# Patient Record
Sex: MALE | Race: WHITE | NOT HISPANIC OR LATINO | URBAN - METROPOLITAN AREA
[De-identification: names, ages, dates, MRNs, and addresses within clinical notes are randomized per-mention and may not be internally consistent; named-entity substitution may affect disease eponyms.]

---

## 2017-12-06 ENCOUNTER — EMERGENCY (EMERGENCY)
Facility: HOSPITAL | Age: 42
LOS: 0 days | Discharge: HOME | End: 2017-12-07
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
## 2017-12-06 DIAGNOSIS — S99.911A UNSPECIFIED INJURY OF RIGHT ANKLE, INITIAL ENCOUNTER: ICD-10-CM
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
## 2017-12-06 DIAGNOSIS — M25.561 PAIN IN RIGHT KNEE: ICD-10-CM

## 2017-12-06 DIAGNOSIS — S93.401A SPRAIN OF UNSPECIFIED LIGAMENT OF RIGHT ANKLE, INITIAL ENCOUNTER: ICD-10-CM

## 2021-10-18 ENCOUNTER — EMERGENCY (EMERGENCY)
Facility: HOSPITAL | Age: 46
LOS: 1 days | Discharge: HOME | End: 2021-10-18
Attending: EMERGENCY MEDICINE
Payer: COMMERCIAL

## 2021-10-18 VITALS
RESPIRATION RATE: 20 BRPM | SYSTOLIC BLOOD PRESSURE: 166 MMHG | HEIGHT: 70 IN | HEART RATE: 81 BPM | OXYGEN SATURATION: 97 % | WEIGHT: 184.97 LBS | TEMPERATURE: 98 F | DIASTOLIC BLOOD PRESSURE: 108 MMHG

## 2021-10-18 PROCEDURE — 99284 EMERGENCY DEPT VISIT MOD MDM: CPT

## 2021-10-18 PROCEDURE — 99283 EMERGENCY DEPT VISIT LOW MDM: CPT

## 2021-10-18 RX ORDER — IBUPROFEN 200 MG
200 TABLET ORAL ONCE
Refills: 0 | Status: COMPLETED | OUTPATIENT
Start: 2021-10-18 | End: 2021-10-18

## 2021-10-18 RX ORDER — CYCLOBENZAPRINE HYDROCHLORIDE 10 MG/1
1 TABLET, FILM COATED ORAL
Qty: 3 | Refills: 0
Start: 2021-10-18 | End: 2021-10-20

## 2021-10-18 RX ORDER — LIDOCAINE 4 G/100G
1 CREAM TOPICAL ONCE
Refills: 0 | Status: COMPLETED | OUTPATIENT
Start: 2021-10-18 | End: 2021-10-18

## 2021-10-18 RX ORDER — ACETAMINOPHEN 500 MG
975 TABLET ORAL ONCE
Refills: 0 | Status: COMPLETED | OUTPATIENT
Start: 2021-10-18 | End: 2021-10-18

## 2021-10-18 RX ORDER — CYCLOBENZAPRINE HYDROCHLORIDE 10 MG/1
10 TABLET, FILM COATED ORAL ONCE
Refills: 0 | Status: COMPLETED | OUTPATIENT
Start: 2021-10-18 | End: 2021-10-18

## 2021-10-18 RX ADMIN — CYCLOBENZAPRINE HYDROCHLORIDE 10 MILLIGRAM(S): 10 TABLET, FILM COATED ORAL at 17:10

## 2021-10-18 RX ADMIN — Medication 200 MILLIGRAM(S): at 15:38

## 2021-10-18 RX ADMIN — Medication 975 MILLIGRAM(S): at 15:38

## 2021-10-18 RX ADMIN — LIDOCAINE 1 PATCH: 4 CREAM TOPICAL at 17:11

## 2021-10-18 NOTE — ED PROVIDER NOTE - CLINICAL SUMMARY MEDICAL DECISION MAKING FREE TEXT BOX
back pain likely sciatica/peripheral neuropathy 2/2 muscle spasm  Pain control  NSAIDs/Tylenol  Lido patch  Muscle relaxants  Spine f/u  Reassess back pain likely sciatica/peripheral neuropathy 2/2 muscle spasm  Pain control  NSAIDs/Tylenol  Lido patch  Muscle relaxants  Spine f/u  Reassess     Attn - acute back pain with sciatica,  NSAIDs, analgesia, muscle relax.  back education, reassess.

## 2021-10-18 NOTE — ED PROVIDER NOTE - PHYSICAL EXAMINATION
CONSTITUTIONAL: Well appearing and in no apparent distress.  ENT: Airway patent, moist mucous membranes.   EYES: Pupils equal, round and reactive to light. EOMI. Conjunctiva normal appearing.   CARDIAC: Normal rate, regular rhythm.  Heart sounds S1, S2.   RESPIRATORY: Breath sounds clear and equal bilaterally.   GASTROINTESTINAL: Abdomen soft, non-tender, not distended.  : No testicular TTP. No hernias noted. Chaperoned by Dr. Cox  MUSCULOSKELETAL: Spine appears normal, no muscle or joint tenderness. No midline TTP. +SLR bilaterally, pain exacerbated more left lower back. DP pulses and sensation intact BLE.   Pt appears uncomfortable any time he repositions himself in stretcher.   NEUROLOGICAL: Alert and oriented x3, no focal deficits, no motor or sensory deficits. 5/5 muscle strength throughout.  SKIN: Skin normal color, warm, dry and intact. CONSTITUTIONAL: Well appearing and in no apparent distress.  ENT: Airway patent, moist mucous membranes.   EYES: Pupils equal, round and reactive to light. EOMI. Conjunctiva normal appearing.   CARDIAC: Normal rate, regular rhythm.  Heart sounds S1, S2.   RESPIRATORY: Breath sounds clear and equal bilaterally.   GASTROINTESTINAL: Abdomen soft, non-tender, not distended.  : No testicular TTP. No hernias noted. Chaperoned by Dr. Cox  MUSCULOSKELETAL: Spine appears normal, no muscle or joint tenderness. No midline TTP. +SLR bilaterally, pain exacerbated more left lower back. DP pulses and sensation intact BLE.   Pt appears uncomfortable any time he repositions himself in stretcher.   NEUROLOGICAL: Alert and oriented x3, no focal deficits, no motor or sensory deficits. 5/5 muscle strength throughout.  SKIN: Skin normal color, warm, dry and intact.     attn - alert, mod pain, Back - no rash or lesion, tender paraL, no CVAT, abdo - soft and NT, ND, no hernia.  +SLR on left and + crossed SLR.  DTRs +2/4,=, motor and sensory intact.  no clonus or foot drop, no leg edema. + distal pulses.
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
## 2021-10-18 NOTE — ED PROVIDER NOTE - OBJECTIVE STATEMENT
45 yo M with a PMH of L inguinal hernia sp lap repair with mesh years ago p/w lower back pain x 3 days. Pt states left lower back worse than right. Has been "tight" for for a few days but pain exacerbated today when he yanked on a heavy door too hard while at work. Took ibuprofen PTA with no relief. Pain is sharp and shooting, worse with certain movements. Pain radiates down LLE to L groin as well and is associated with a numbness down his L thigh. Denies nausea, vomiting, fever, chills, chest pain, SOB, abd pain, diarrhea, urinary complaints, extremity weakness, direct trauma, syncope, bladder/bowel dsfxn. Denies hx of back surgery 45 yo M with a PMH of L inguinal hernia sp lap repair with mesh years ago p/w lower back pain x 3 days. Pt states left lower back worse than right. Has been "tight" for for a few days but pain exacerbated today when he yanked on a heavy door too hard while at work. Took ibuprofen PTA with no relief. Pain is sharp and shooting, worse with certain movements. Pain radiates down LLE to L groin as well and is associated with a numbness down his L thigh. Denies nausea, vomiting, fever, chills, chest pain, SOB, abd pain, diarrhea, urinary complaints, extremity weakness, direct trauma, syncope, bladder/bowel dsfxn. Denies hx of back surgery     Attn - pt seen in [facility] -  agree with above except as noted.  c/o lower back pain with radiation to left hip and groin/inner thigh. 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  no red flags for back pain.  hx of prior back pain and not currently under care.  Rec'd tylenol and motrin in triage and decreased pain.

## 2021-10-18 NOTE — ED PROVIDER NOTE - NSFOLLOWUPINSTRUCTIONS_ED_ALL_ED_FT
You were seen and evaluated in the ED for low back pain.   Please make sure to follow up with your primary care doctor within 1-2 days and with the specialist. The information for follow up can be found below. Bring a copy of all of your results with you to your follow up appointments.   Return to the ER as discussed if you develop any new or worsening symptoms.    You may take Tylenol 1000mg and Ibuprofen 400mg every 6 hours as needed for pain. Please make sure to take ibuprofen with food.  You can also use Lidocaine patches as recommended that are available OTC at the pharmacy (ex. [facility]).      Schedule an appointment with the spine specialist. The number is   [phone]    Return to the emergency room immediately if your symptoms worsen or persist, fever, new or worsening pain in the affected area, difficulty walking, weight loss, redness of the back, abdominal pain, vomiting, incontinence (pooping or peeing yourself), unusual numbness, tingling, weakness, or if any other concerning or questionable symptoms.      Acute Low Back Pain    WHAT YOU NEED TO KNOW:    Acute low back pain is sudden discomfort that lasts up to 6 weeks and makes activity difficult.    DISCHARGE INSTRUCTIONS:    Return to the emergency department if:   •You have severe pain.      •You have sudden stiffness and heaviness on both buttocks down to both legs.      •You have numbness or weakness in one leg, or pain in both legs.      •You have numbness in your genital area or across your lower back.      •You cannot control your urine or bowel movements.      Call your doctor if:   •You have a fever.      •You have pain at night or when you rest.      •Your pain does not get better with treatment.      •You have pain that worsens when you cough or sneeze.      •You suddenly feel something pop or snap in your back.      •You have questions or concerns about your condition or care.      Medicines: You may need any of the following:  •NSAIDs, such as ibuprofen, help decrease swelling, pain, and fever. This medicine is available with or without a doctor's order. NSAIDs can cause stomach bleeding or kidney problems in certain people. If you take blood thinner medicine, always ask your healthcare provider if NSAIDs are safe for you. Always read the medicine label and follow directions.      •Acetaminophen decreases pain and fever. It is available without a doctor's order. Ask how much to take and how often to take it. Follow directions. Read the labels of all other medicines you are using to see if they also contain acetaminophen, or ask your doctor or pharmacist. Acetaminophen can cause liver damage if not taken correctly. Do not use more than 4 grams (4,000 milligrams) total of acetaminophen in one day.       •Muscle relaxers decrease pain by relaxing the muscles in your lower spine.      •Prescription pain medicine may be given. Ask your healthcare provider how to take this medicine safely. Some prescription pain medicines contain acetaminophen. Do not take other medicines that contain acetaminophen without talking to your healthcare provider. Too much acetaminophen may cause liver damage. Prescription pain medicine may cause constipation. Ask your healthcare provider how to prevent or treat constipation.       Self-care:   •Stay active as much as you can without causing more pain. Bed rest could make your back pain worse. Start with some light exercises, such as walking. Avoid heavy lifting until your pain is gone. Ask for more information about the activities or exercises that are right for you.   Family Walking for Exercise           •Apply heat on your back for 20 to 30 minutes every 2 hours for as many days as directed. Heat helps decrease pain and muscle spasms. Alternate heat and ice.      •Apply ice on your back for 15 to 20 minutes every hour or as directed. Use an ice pack, or put crushed ice in a plastic bag. Cover it with a towel before you apply it to your skin. Ice helps prevent tissue damage and decreases swelling and pain.      Prevent acute low back pain:   •Use proper body mechanics. ?Bend at the hips and knees when you  objects. Do not bend from the waist. Use your leg muscles as you lift the load. Do not use your back. Keep the object close to your chest as you lift it. Try not to twist or lift anything above your waist.      ?Change your position often when you stand for long periods of time. Rest one foot on a small box or footrest, and then switch to the other foot often.      ?Try not to sit for long periods of time. When you do, sit in a straight-backed chair with your feet flat on the floor. Never reach, pull, or push while you are sitting.      •Do exercises that strengthen your back muscles. Warm up before you exercise. Ask your healthcare provider the best exercises for you.      •Maintain a healthy weight. Ask your healthcare provider what a healthy weight is for you. Ask him or her to help you create a weight loss plan if you are overweight. You were seen and evaluated in the ED for low back pain.   Please make sure to follow up with your primary care doctor within 1-2 days and with the specialist. The information for follow up can be found below. Bring a copy of all of your results with you to your follow up appointments.   Return to the ER as discussed if you develop any new or worsening symptoms.    You may take Tylenol 1000mg and Ibuprofen 400mg every 6 hours as needed for pain. Please make sure to take ibuprofen with food.  You can also use Lidocaine patches as recommended that are available OTC at the pharmacy (ex. Salonpas).      Schedule an appointment with the spine specialist for further evaluation and management of your sciatica. The number is [phone]    Return to the emergency room immediately if your symptoms worsen or persist, fever, new or worsening pain in the affected area, difficulty walking, weight loss, redness of the back, abdominal pain, vomiting, incontinence (pooping or peeing yourself), unusual numbness, tingling, weakness, or if any other concerning or questionable symptoms.      Acute Low Back Pain    WHAT YOU NEED TO KNOW:    Acute low back pain is sudden discomfort that lasts up to 6 weeks and makes activity difficult.    DISCHARGE INSTRUCTIONS:    Return to the emergency department if:   •You have severe pain.      •You have sudden stiffness and heaviness on both buttocks down to both legs.      •You have numbness or weakness in one leg, or pain in both legs.      •You have numbness in your genital area or across your lower back.      •You cannot control your urine or bowel movements.      Call your doctor if:   •You have a fever.      •You have pain at night or when you rest.      •Your pain does not get better with treatment.      •You have pain that worsens when you cough or sneeze.      •You suddenly feel something pop or snap in your back.      •You have questions or concerns about your condition or care.      Medicines: You may need any of the following:  •NSAIDs, such as ibuprofen, help decrease swelling, pain, and fever. This medicine is available with or without a doctor's order. NSAIDs can cause stomach bleeding or kidney problems in certain people. If you take blood thinner medicine, always ask your healthcare provider if NSAIDs are safe for you. Always read the medicine label and follow directions.      •Acetaminophen decreases pain and fever. It is available without a doctor's order. Ask how much to take and how often to take it. Follow directions. Read the labels of all other medicines you are using to see if they also contain acetaminophen, or ask your doctor or pharmacist. Acetaminophen can cause liver damage if not taken correctly. Do not use more than 4 grams (4,000 milligrams) total of acetaminophen in one day.       •Muscle relaxers decrease pain by relaxing the muscles in your lower spine.      •Prescription pain medicine may be given. Ask your healthcare provider how to take this medicine safely. Some prescription pain medicines contain acetaminophen. Do not take other medicines that contain acetaminophen without talking to your healthcare provider. Too much acetaminophen may cause liver damage. Prescription pain medicine may cause constipation. Ask your healthcare provider how to prevent or treat constipation.       Self-care:   •Stay active as much as you can without causing more pain. Bed rest could make your back pain worse. Start with some light exercises, such as walking. Avoid heavy lifting until your pain is gone. Ask for more information about the activities or exercises that are right for you.   Family Walking for Exercise           •Apply heat on your back for 20 to 30 minutes every 2 hours for as many days as directed. Heat helps decrease pain and muscle spasms. Alternate heat and ice.      •Apply ice on your back for 15 to 20 minutes every hour or as directed. Use an ice pack, or put crushed ice in a plastic bag. Cover it with a towel before you apply it to your skin. Ice helps prevent tissue damage and decreases swelling and pain.      Prevent acute low back pain:   •Use proper body mechanics. ?Bend at the hips and knees when you  objects. Do not bend from the waist. Use your leg muscles as you lift the load. Do not use your back. Keep the object close to your chest as you lift it. Try not to twist or lift anything above your waist.      ?Change your position often when you stand for long periods of time. Rest one foot on a small box or footrest, and then switch to the other foot often.      ?Try not to sit for long periods of time. When you do, sit in a straight-backed chair with your feet flat on the floor. Never reach, pull, or push while you are sitting.      •Do exercises that strengthen your back muscles. Warm up before you exercise. Ask your healthcare provider the best exercises for you.      •Maintain a healthy weight. Ask your healthcare provider what a healthy weight is for you. Ask him or her to help you create a weight loss plan if you are overweight.

## 2021-10-18 NOTE — ED PROVIDER NOTE - RAPID ASSESSMENT
46y M with pmhx of inguinal hernia repair in July p/w L lower back pain. Pt states he picked something up at work when he felt sudden back pain. Pain radiating to L groin and down L leg. Notes mild numbness and tingling to leg. Took ibuprofen 1hr PTA with no relief. Denies weakness. Denies fall. Denies hx of back surgery.    Patient was seen as a tele QDOC patient. The patient will be seen and further worked up in the main emergency department and their care will be completed by the main emergency department team along with a thorough physical exam. Receiving team will follow up on labs, analgesia, any clinical imaging, reassess and disposition as clinically indicated, all decisions regarding the progression of care will be made at their discretion.    Scribe Statement: Mima MCGILL, attest that this documentation has been prepared under the direction and in the presence of Annette Davis). 46y M with pmhx of inguinal hernia repair in July p/w L lower back pain. Pt states he picked something up at work when he felt sudden back pain. Pain radiating to L groin and down L leg. Notes mild numbness and tingling to leg. Took ibuprofen 1hr PTA with no relief. Denies weakness. Denies fall. Denies hx of back surgery.  I, Dr. Davis,  personally performed the rapid assessment described in the documentation, reviewed the rapid assessment documentation recorded by the scribe in my presence and it accurately and completely records my words and action.      Patient was seen as a tele QDOC patient. The patient will be seen and further worked up in the main emergency department and their care will be completed by the main emergency department team along with a thorough physical exam. Receiving team will follow up on labs, analgesia, any clinical imaging, reassess and disposition as clinically indicated, all decisions regarding the progression of care will be made at their discretion.    Scribe Statement: I, Mima Barber, attest that this documentation has been prepared under the direction and in the presence of Annette Davis (). 46y M with pmhx of inguinal hernia repair in July p/w L lower back pain. Pt states he picked something up at work when he felt sudden back pain. Pain radiating to L groin and down L leg. Notes mild numbness and tingling to leg. Took ibuprofen 1hr PTA with no relief. Denies weakness. Denies fall. Denies hx of back surgery.  IDr. Davis,  personally performed the rapid assessment described in the documentation, reviewed the rapid assessment documentation recorded by the scribe in my presence and it accurately and completely records my words and action.    IDr. Davis,  personally performed the rapid assessment described in the documentation, reviewed the rapid assessment documentation recorded by the scribe in my presence and it accurately and completely records my words and action.        Patient was seen as a tele QDOC patient. The patient will be seen and further worked up in the main emergency department and their care will be completed by the main emergency department team along with a thorough physical exam. Receiving team will follow up on labs, analgesia, any clinical imaging, reassess and disposition as clinically indicated, all decisions regarding the progression of care will be made at their discretion.    Scribe Statement: I, Mima Barber, attest that this documentation has been prepared under the direction and in the presence of Annette Davis (). 46y M with pmhx of inguinal hernia repair in July p/w L lower back pain. Pt states he picked something up at work when he felt sudden back pain. Pain radiating to L groin and down L leg. Notes mild numbness and tingling to leg. Took ibuprofen 1hr PTA with no relief. Denies weakness. Denies fall. Denies hx of back surgery.              Patient was seen as a tele QDOC patient. The patient will be seen and further worked up in the main emergency department and their care will be completed by the main emergency department team along with a thorough physical exam. Receiving team will follow up on labs, analgesia, any clinical imaging, reassess and disposition as clinically indicated, all decisions regarding the progression of care will be made at their discretion.    Scribe Statement: I, Mima Barber, attest that this documentation has been prepared under the direction and in the presence of Annette Davis ().    I, Dr. Davis,  personally performed the rapid assessment described in the documentation, reviewed the rapid assessment documentation recorded by the scribe in my presence and it accurately and completely records my words and action.

## 2021-10-28 ENCOUNTER — EMERGENCY (EMERGENCY)
Facility: HOSPITAL | Age: 46
LOS: 1 days | Discharge: HOME | End: 2021-10-28
Attending: EMERGENCY MEDICINE
Payer: COMMERCIAL

## 2021-10-28 VITALS
TEMPERATURE: 98 F | HEART RATE: 78 BPM | OXYGEN SATURATION: 97 % | DIASTOLIC BLOOD PRESSURE: 101 MMHG | HEIGHT: 70 IN | WEIGHT: 184.97 LBS | SYSTOLIC BLOOD PRESSURE: 138 MMHG | RESPIRATION RATE: 128 BRPM

## 2021-10-28 VITALS
DIASTOLIC BLOOD PRESSURE: 82 MMHG | HEART RATE: 77 BPM | SYSTOLIC BLOOD PRESSURE: 128 MMHG | RESPIRATION RATE: 18 BRPM | TEMPERATURE: 98 F | OXYGEN SATURATION: 98 %

## 2021-10-28 PROBLEM — Z00.00 ENCOUNTER FOR PREVENTIVE HEALTH EXAMINATION: Status: ACTIVE | Noted: 2021-10-28

## 2021-10-28 PROCEDURE — 99284 EMERGENCY DEPT VISIT MOD MDM: CPT

## 2021-10-28 PROCEDURE — 99284 EMERGENCY DEPT VISIT MOD MDM: CPT | Mod: 25

## 2021-10-28 PROCEDURE — 93975 VASCULAR STUDY: CPT | Mod: 26

## 2021-10-28 PROCEDURE — 76870 US EXAM SCROTUM: CPT

## 2021-10-28 PROCEDURE — 93975 VASCULAR STUDY: CPT

## 2021-10-28 PROCEDURE — 76870 US EXAM SCROTUM: CPT | Mod: 26

## 2021-10-28 RX ORDER — ACETAMINOPHEN 500 MG
975 TABLET ORAL ONCE
Refills: 0 | Status: COMPLETED | OUTPATIENT
Start: 2021-10-28 | End: 2021-10-28

## 2021-10-28 RX ORDER — DIAZEPAM 5 MG
1 TABLET ORAL
Qty: 9 | Refills: 0
Start: 2021-10-28 | End: 2021-10-30

## 2021-10-28 RX ADMIN — Medication 975 MILLIGRAM(S): at 16:22

## 2021-10-28 NOTE — ED PROVIDER NOTE - ATTENDING CONTRIBUTION TO CARE
L lower back pain since injury about 9 days ago.  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  Pain radiates to L groin, not into L leg, no loss of sensation, no loss of continence, strength remains normal.  Has follow up with general surgeon tomorrow who performed his prior L inguinal hernia repair and spine center next week.  Having ongoing pain so presenting for re-eval.  On exam no midline tenderness, neurovascularly intact, strength intact.  Suspect lumbar sprain/strain clinically, less likely nerve impingement, no signs/symptoms of cauda equina or spinal cord emergency, will obtain testicular US to evaluate for subclinical failure of inguinal hernia repair, likely continue outpatient management for same.

## 2021-10-28 NOTE — ED ADULT TRIAGE NOTE - CHIEF COMPLAINT QUOTE
Left lower back to L testicle pain since October 18th, no urinary symptoms, no numbness/ tingling to extremities, no fever, no weight loss

## 2021-10-28 NOTE — ED PROVIDER NOTE - OBJECTIVE STATEMENT
45 yo male in blue 32 presents to the ER for evaluation of left lower back pain as well as testicular pain. Pt awake, alert oriented x3 states "I injured my lower back at work on Oct 18th , ten days ago while try to open a door that was stuck.  I was seen in the ER and given medications. The flexiril  prescription ran out and now my pain is back.  I also have had  pain to my left testicle. Over the summer I had an inguinal repair and was concerned there was an issue. Pt denies fevers and chills.   DEnies urinary complaints.    DEnies numbness tingling weakness, denies loss of bowel or bladder function.  Pt ambulates with steady gait.

## 2021-10-28 NOTE — ED PROVIDER NOTE - CHIEF COMPLAINT
The patient is a 46y Male complaining of  The patient is a 46y Male complaining of left lower back pain  and testicular pain

## 2021-10-28 NOTE — ED ADULT NURSE NOTE - OBJECTIVE STATEMENT
45 y/o male coming in from home complaining of back/testicle pain. AOx4, ambulatory. Pt. reports he was here in [facility] ED on 10/18 with left back and testicular pain. Pt. was sent home with cyclobenzaprine and told it was sciatica. Reports medication helped while he was on it. Pt. is no longer on medication. States he is still having left lower back pain with radiation to left testicle. Denies any swelling, redness, discharge to the area. Denies any burning or bleeding with urination. Denies fevers/chills. Pt. is well appearing and denies any other complaints. Will continue to reassess.

## 2021-10-28 NOTE — ED PROVIDER NOTE - PATIENT PORTAL LINK FT
You can access the FollowMyHealth Patient Portal offered by [facility] by registering at the following website: [email or web address]. By joining [facility]’s FollowMyHealth portal, you will also be able to view your health information using other applications (apps) compatible with our system.

## 2021-10-28 NOTE — ED PROVIDER NOTE - NSFOLLOWUPINSTRUCTIONS_ED_ALL_ED_FT
1. Follow up with your primary care physician within 2-3days for reevaluation.  2.  Return to the Emergency Department for worsening, progressive or any other concerning symptoms.   3. -- Please use 650mg Tylenol (also called acetaminophen) every 4 hours & 600mg Motrin (also called Advil or ibuprofen) every 6 hours as needed for pain/discomfort/swelling. You can get these without a prescription. Don't use more than 3500mg of Tylenol in any 24-hour period. Make sure your other prescription/over-the-counter medications don't contain any Tylenol so you don't take too much. If you have any stomach discomfort while taking Motrin, you can use TUMS or Pepcid or Zantac (these can all be bought without a prescription).   4. Continue using lidocaine patch daily as needed  5. Take valium as needed every 8 hrs- do not drive or work while taking this medication  6. Follow up with your surgeon and spine physician as arranged previously

## 2021-11-03 ENCOUNTER — APPOINTMENT (OUTPATIENT)
Dept: ORTHOPEDIC SURGERY | Facility: CLINIC | Age: 46
End: 2021-11-03
Payer: OTHER MISCELLANEOUS

## 2021-11-03 VITALS — HEIGHT: 70 IN | WEIGHT: 185 LBS | BODY MASS INDEX: 26.48 KG/M2

## 2021-11-03 DIAGNOSIS — K46.9 UNSPECIFIED ABDOMINAL HERNIA W/OUT OBSTRUCTION OR GANGRENE: ICD-10-CM

## 2021-11-03 DIAGNOSIS — N50.812 LEFT TESTICULAR PAIN: ICD-10-CM

## 2021-11-03 PROCEDURE — 99072 ADDL SUPL MATRL&STAF TM PHE: CPT

## 2021-11-03 PROCEDURE — 99204 OFFICE O/P NEW MOD 45 MIN: CPT

## 2021-11-03 PROCEDURE — 72110 X-RAY EXAM L-2 SPINE 4/>VWS: CPT

## 2021-11-03 RX ORDER — CYCLOBENZAPRINE HYDROCHLORIDE 10 MG/1
10 TABLET, FILM COATED ORAL 3 TIMES DAILY
Qty: 42 | Refills: 2 | Status: ACTIVE | COMMUNITY
Start: 2021-11-03 | End: 1900-01-01

## 2021-11-03 RX ORDER — METHYLPREDNISOLONE 4 MG/1
4 TABLET ORAL
Qty: 1 | Refills: 0 | Status: ACTIVE | COMMUNITY
Start: 2021-11-03 | End: 1900-01-01

## 2021-11-08 PROBLEM — K46.9 HERNIA: Status: RESOLVED | Noted: 2021-11-08 | Resolved: 2021-11-08

## 2021-11-08 NOTE — HISTORY OF PRESENT ILLNESS
[de-identified] : 46 year old male presents with back pain and pain into his left testicle.  He had a work injury on 10/18/21 when he was pulling a metal door.  He reports pain in his left testicle that is similar to when he had a hernia which was repaired in the past.  The surgeon who did his hernia repair does not take worker's comp.  He denies radicular pain, recent illness, fevers, numbness, weakness, balance problems, saddle anesthesia, urinary retention or fecal incontinence.

## 2021-11-08 NOTE — ASSESSMENT
[FreeTextEntry1] : 46 year old male presents with back pain and pain into his left testicle.  He had a work injury on 10/18/21 when he was pulling a metal door.  He reports pain in his left testicle that is similar to when he had a hernia which was repaired in the past.  The surgeon who did his hernia repair does not take worker's comp.  He is otherwise neurologically intact.  He will be sent for a lumbar MRI.  He was given a referral to a general surgeon to evaluate his hernia. He was also given a referral to urology to evaluate his testicular pain.  He will followup once his MRI has been completed.  He was given prescriptions for diclofenac, cyclobenzaprine an a steroid dose pack.  He will followup in 1 week.  He will remain out of work. We discussed red flag symptoms that would require emergent evaluation. He knows to call with any questions or concerns or if his symptoms acutely worsen.

## 2021-11-08 NOTE — PHYSICAL EXAM
[de-identified] : General: No acute distress, conversant, well-nourished.\par Head: Normocephalic, atraumatic\par Neck: trachea midline, FROM\par Heart: normotensive and normal rate and rhythm\par Lungs: No labored breathing\par Skin: No abrasions, no rashes, no edema\par Psych: Alert and oriented to person, place and time\par Extremities: no peripheral edema or digital cyanosis\par Gait: Normal gait. Can perform tandem gait.  \par Vascular: warm and well perfused distally, palpable distal pulses\par \par MSK:\par Lumbar spine:\par Alignment normal.\par No tenderness to palpation.  No step-off, no deformity.\par No pain or neurologic symptoms with full active range of motion (flexion, extension, lateral bending and rotation).\par \par NEURO EXAM:\par Sensation \par Left L2  -  2/2            \par Left L3  -  2/2\par Left L4  -  2/2\par Left L5  -  2/2\par Left S1  -  2/2\par \par Right L2  -  2/2            \par Right L3  -  2/2\par Right L4  -  2/2\par Right L5  -  2/2\par Right S1  -  2/2\par \par Motor: \par Left L2 (hip flexion)                            5/5                \par Left L3 (knee extension)                   5/5                \par Left L4 (ankle dorsiflexion)                 5/5                \par Left L5 (long toe extensor)                5/5                \par Left S1 (ankle plantar flexion)           5/5\par \par Right L2 (hip flexion)                            5/5                \par Right L3 (knee extension)                   5/5                \par Right L4 (ankle dorsiflexion)                 5/5                \par Right L5 (long toe extensor)                5/5                \par Right S1 (ankle plantar flexion)           5/5\par \par Reflexes: Normal and symmetric\par Negative clonus.  Down-going Babinski.   [de-identified] : I ordered radiographs to evaluate the patient's symptoms.\par \par Lumbar 4 view radiographs taken in the office today show no dislocation or fracture.  Lumbar spondylosis.  No instability on dynamic series.

## 2021-11-30 ENCOUNTER — RX RENEWAL (OUTPATIENT)
Age: 46
End: 2021-11-30

## 2021-11-30 ENCOUNTER — APPOINTMENT (OUTPATIENT)
Dept: ORTHOPEDIC SURGERY | Facility: CLINIC | Age: 46
End: 2021-11-30
Payer: OTHER MISCELLANEOUS

## 2021-11-30 DIAGNOSIS — K40.90 UNILATERAL INGUINAL HERNIA, W/OUT OBSTRUCTION OR GANGRENE, NOT SPECIFIED AS RECURRENT: ICD-10-CM

## 2021-11-30 PROCEDURE — 99072 ADDL SUPL MATRL&STAF TM PHE: CPT

## 2021-11-30 PROCEDURE — 99214 OFFICE O/P EST MOD 30 MIN: CPT

## 2021-11-30 RX ORDER — DICLOFENAC SODIUM 75 MG/1
75 TABLET, DELAYED RELEASE ORAL
Qty: 60 | Refills: 0 | Status: ACTIVE | COMMUNITY
Start: 2021-11-30 | End: 1900-01-01

## 2021-11-30 RX ORDER — DICLOFENAC SODIUM 75 MG/1
75 TABLET, DELAYED RELEASE ORAL
Qty: 60 | Refills: 0 | Status: ACTIVE | COMMUNITY
Start: 2021-11-03 | End: 1900-01-01

## 2021-12-16 PROBLEM — K40.90 INGUINAL HERNIA: Status: ACTIVE | Noted: 2021-11-03

## 2021-12-16 NOTE — HISTORY OF PRESENT ILLNESS
[de-identified] : 46 year old male followup with back pain and pain into his left testicle.  He had a work injury on 10/18/21 when he was pulling a metal door.  He reports pain in his left testicle that is similar to when he had a hernia which was repaired in the past.  The surgeon who did his hernia repair does not take worker's comp. Since his last visit the pain has significantly improved. He reported that the steroid dose pack was very effective.  He takes Diclofenac as needed with relief.  He denies radicular pain, recent illness, fevers, numbness, weakness, balance problems, saddle anesthesia, urinary retention or fecal incontinence. He is here to review his MRI.

## 2021-12-16 NOTE — PHYSICAL EXAM
[de-identified] : General: No acute distress, conversant, well-nourished.\par Head: Normocephalic, atraumatic\par Neck: trachea midline, FROM\par Heart: normotensive and normal rate and rhythm\par Lungs: No labored breathing\par Skin: No abrasions, no rashes, no edema\par Psych: Alert and oriented to person, place and time\par Extremities: no peripheral edema or digital cyanosis\par Gait: Normal gait. Can perform tandem gait.  \par Vascular: warm and well perfused distally, palpable distal pulses\par \par MSK:\par Lumbar spine:\par Alignment normal.\par No tenderness to palpation.  No step-off, no deformity.\par No pain or neurologic symptoms with full active range of motion (flexion, extension, lateral bending and rotation).\par \par NEURO EXAM:\par Sensation \par Left L2  -  2/2            \par Left L3  -  2/2\par Left L4  -  2/2\par Left L5  -  2/2\par Left S1  -  2/2\par \par Right L2  -  2/2            \par Right L3  -  2/2\par Right L4  -  2/2\par Right L5  -  2/2\par Right S1  -  2/2\par \par Motor: \par Left L2 (hip flexion)                            5/5                \par Left L3 (knee extension)                   5/5                \par Left L4 (ankle dorsiflexion)                 5/5                \par Left L5 (long toe extensor)                5/5                \par Left S1 (ankle plantar flexion)           5/5\par \par Right L2 (hip flexion)                            5/5                \par Right L3 (knee extension)                   5/5                \par Right L4 (ankle dorsiflexion)                 5/5                \par Right L5 (long toe extensor)                5/5                \par Right S1 (ankle plantar flexion)           5/5\par \par Reflexes: Normal and symmetric\par Negative clonus.  Down-going Babinski.   [de-identified] : Lumbar MRI (11/19/21): No acute fracture or dislocation.  L5-S1 disc degeneration and bulging.  No compression of neural elements.\par \par Lumbar 4 view radiographs (11/3/21) no dislocation or fracture.  Lumbar spondylosis.  No instability on dynamic series.

## 2021-12-16 NOTE — ASSESSMENT
[FreeTextEntry1] : 46 year old male followup with back pain and pain into his left testicle.  He had a work injury on 10/18/21 when he was pulling a metal door.  He reports pain in his left testicle that is similar to when he had a hernia which was repaired in the past.  The surgeon who did his hernia repair does not take worker's comp. Since his last visit the pain has significantly improved. He has no radicular pain and is neurologically intact.  We reviewed his lumbar MRI which shows mild degenerative changes without compression of the neural elements.  No indication for spine surgery.  He will proceed with evaluation with a general surgeon regarding his inguinal hernia repair. He will see a urologist.  He will continue Diclofenac.  He will followup in 4-6 weeks.  We discussed red flag symptoms that would require emergent evaluation. He knows to call with any questions or concerns or if his symptoms acutely worsen.

## 2022-04-27 ENCOUNTER — APPOINTMENT (OUTPATIENT)
Dept: ORTHOPEDIC SURGERY | Facility: CLINIC | Age: 47
End: 2022-04-27
Payer: OTHER MISCELLANEOUS

## 2022-04-27 DIAGNOSIS — M54.9 DORSALGIA, UNSPECIFIED: ICD-10-CM

## 2022-04-27 DIAGNOSIS — M54.16 RADICULOPATHY, LUMBAR REGION: ICD-10-CM

## 2022-04-27 PROCEDURE — 99214 OFFICE O/P EST MOD 30 MIN: CPT

## 2022-04-27 PROCEDURE — 99072 ADDL SUPL MATRL&STAF TM PHE: CPT

## 2022-04-27 RX ORDER — METHYLPREDNISOLONE 4 MG/1
4 TABLET ORAL
Qty: 1 | Refills: 0 | Status: ACTIVE | COMMUNITY
Start: 2022-04-27 | End: 1900-01-01

## 2022-04-28 ENCOUNTER — FORM ENCOUNTER (OUTPATIENT)
Age: 47
End: 2022-04-28

## 2022-04-28 NOTE — PHYSICAL EXAM
[de-identified] : General: No acute distress, conversant, well-nourished.\par Head: Normocephalic, atraumatic\par Neck: trachea midline, FROM\par Heart: normotensive and normal rate and rhythm\par Lungs: No labored breathing\par Skin: No abrasions, no rashes, no edema\par Psych: Alert and oriented to person, place and time\par Extremities: no peripheral edema or digital cyanosis\par Gait: Normal gait. Can perform tandem gait.  \par Vascular: warm and well perfused distally, palpable distal pulses\par \par MSK:\par Lumbar spine:\par Alignment normal.\par No tenderness to palpation.  No step-off, no deformity.\par No pain or neurologic symptoms with full active range of motion (flexion, extension, lateral bending and rotation).\par \par NEURO EXAM:\par Sensation \par Left L2  -  2/2            \par Left L3  -  2/2\par Left L4  -  2/2\par Left L5  -  2/2\par Left S1  -  2/2\par \par Right L2  -  2/2            \par Right L3  -  2/2\par Right L4  -  2/2\par Right L5  -  2/2\par Right S1  -  2/2\par \par Motor: \par Left L2 (hip flexion)                            5/5                \par Left L3 (knee extension)                   5/5                \par Left L4 (ankle dorsiflexion)                 5/5                \par Left L5 (long toe extensor)                5/5                \par Left S1 (ankle plantar flexion)           5/5\par \par Right L2 (hip flexion)                            5/5                \par Right L3 (knee extension)                   5/5                \par Right L4 (ankle dorsiflexion)                 5/5                \par Right L5 (long toe extensor)                5/5                \par Right S1 (ankle plantar flexion)           5/5\par \par Reflexes: Normal and symmetric\par Negative clonus.  Down-going Babinski.   [de-identified] : Lumbar MRI (11/19/21): No acute fracture or dislocation.  L5-S1 disc degeneration and bulging.  No compression of neural elements.\par \par Lumbar 4 view radiographs (11/3/21) no dislocation or fracture.  Lumbar spondylosis.  No instability on dynamic series.

## 2022-04-28 NOTE — ASSESSMENT
[FreeTextEntry1] : 46 year old male followup with back pain.  He had a work injury on 10/18/21 when he was pulling a metal door.  Since his last visit the pain had initially resolved.  However recently the back pain has returned.  He denies radicular pain.  He is neurologically intact.  He was given a steroid dose pack which was effective for him in the past.  He was given a referral for PT.  He will followup in 2 weeks. We discussed red flag symptoms that would require emergent evaluation.  He knows to call with any questions or concerns or if his symptoms acutely worsen.

## 2022-04-28 NOTE — REASON FOR VISIT
[Follow-Up Visit] : a follow-up visit for [Back Pain] : back pain [FreeTextEntry2] : After work related injury on 10/18/21. Pain has returned

## 2022-04-28 NOTE — HISTORY OF PRESENT ILLNESS
[de-identified] : 46 year old male followup with back pain.  He had a work injury on 10/18/21 when he was pulling a metal door.  Since his last visit the pain had initially resolved.  However recently the back pain has returned.  He denies radicular pain, recent illness, fevers, numbness, weakness, balance problems, saddle anesthesia, urinary retention or fecal incontinence.  He has been working very long hours.

## 2022-05-02 ENCOUNTER — FORM ENCOUNTER (OUTPATIENT)
Age: 47
End: 2022-05-02

## 2022-05-16 ENCOUNTER — TRANSCRIPTION ENCOUNTER (OUTPATIENT)
Age: 47
End: 2022-05-16

## 2022-06-24 ENCOUNTER — APPOINTMENT (OUTPATIENT)
Dept: ORTHOPEDIC SURGERY | Facility: CLINIC | Age: 47
End: 2022-06-24